# Patient Record
Sex: MALE
[De-identification: names, ages, dates, MRNs, and addresses within clinical notes are randomized per-mention and may not be internally consistent; named-entity substitution may affect disease eponyms.]

---

## 2020-12-15 ENCOUNTER — HOSPITAL ENCOUNTER (OUTPATIENT)
Dept: HOSPITAL 92 - LABBT | Age: 60
Discharge: HOME | End: 2020-12-15
Attending: ORTHOPAEDIC SURGERY
Payer: COMMERCIAL

## 2020-12-15 DIAGNOSIS — Z01.812: Primary | ICD-10-CM

## 2020-12-15 DIAGNOSIS — S82.201D: ICD-10-CM

## 2020-12-15 DIAGNOSIS — Z20.828: ICD-10-CM

## 2020-12-15 DIAGNOSIS — S72.92XD: ICD-10-CM

## 2020-12-15 PROCEDURE — 87635 SARS-COV-2 COVID-19 AMP PRB: CPT

## 2020-12-15 PROCEDURE — U0003 INFECTIOUS AGENT DETECTION BY NUCLEIC ACID (DNA OR RNA); SEVERE ACUTE RESPIRATORY SYNDROME CORONAVIRUS 2 (SARS-COV-2) (CORONAVIRUS DISEASE [COVID-19]), AMPLIFIED PROBE TECHNIQUE, MAKING USE OF HIGH THROUGHPUT TECHNOLOGIES AS DESCRIBED BY CMS-2020-01-R: HCPCS

## 2020-12-18 ENCOUNTER — HOSPITAL ENCOUNTER (INPATIENT)
Dept: HOSPITAL 92 - SDC | Age: 60
LOS: 2 days | Discharge: HOME | DRG: 481 | End: 2020-12-20
Attending: ORTHOPAEDIC SURGERY | Admitting: ORTHOPAEDIC SURGERY
Payer: COMMERCIAL

## 2020-12-18 VITALS — BODY MASS INDEX: 29.9 KG/M2

## 2020-12-18 DIAGNOSIS — I10: ICD-10-CM

## 2020-12-18 DIAGNOSIS — Z79.84: ICD-10-CM

## 2020-12-18 DIAGNOSIS — S72.332K: ICD-10-CM

## 2020-12-18 DIAGNOSIS — S82.231K: Primary | ICD-10-CM
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
## 2020-12-18 DIAGNOSIS — Z90.49: ICD-10-CM

## 2020-12-18 DIAGNOSIS — Z79.899: ICD-10-CM

## 2020-12-18 DIAGNOSIS — E11.9: ICD-10-CM

## 2020-12-18 DIAGNOSIS — T84.115A: ICD-10-CM

## 2020-12-18 PROCEDURE — 36416 COLLJ CAPILLARY BLOOD SPEC: CPT

## 2020-12-18 PROCEDURE — 0QH906Z INSERTION OF INTRAMEDULLARY INTERNAL FIXATION DEVICE INTO LEFT FEMORAL SHAFT, OPEN APPROACH: ICD-10-PCS | Performed by: ORTHOPAEDIC SURGERY

## 2020-12-18 PROCEDURE — C1713 ANCHOR/SCREW BN/BN,TIS/BN: HCPCS

## 2020-12-18 PROCEDURE — 76000 FLUOROSCOPY <1 HR PHYS/QHP: CPT

## 2020-12-18 PROCEDURE — 93005 ELECTROCARDIOGRAM TRACING: CPT

## 2020-12-18 PROCEDURE — 0QSG04Z REPOSITION RIGHT TIBIA WITH INTERNAL FIXATION DEVICE, OPEN APPROACH: ICD-10-PCS | Performed by: ORTHOPAEDIC SURGERY

## 2020-12-18 PROCEDURE — 93010 ELECTROCARDIOGRAM REPORT: CPT

## 2020-12-18 PROCEDURE — 0QP904Z REMOVAL OF INTERNAL FIXATION DEVICE FROM LEFT FEMORAL SHAFT, OPEN APPROACH: ICD-10-PCS | Performed by: ORTHOPAEDIC SURGERY

## 2020-12-18 PROCEDURE — 0QUG07Z SUPPLEMENT RIGHT TIBIA WITH AUTOLOGOUS TISSUE SUBSTITUTE, OPEN APPROACH: ICD-10-PCS | Performed by: ORTHOPAEDIC SURGERY

## 2020-12-18 PROCEDURE — 36415 COLL VENOUS BLD VENIPUNCTURE: CPT

## 2020-12-18 PROCEDURE — 0QDB0ZZ EXTRACTION OF RIGHT LOWER FEMUR, OPEN APPROACH: ICD-10-PCS | Performed by: ORTHOPAEDIC SURGERY

## 2020-12-18 PROCEDURE — 0QPG04Z REMOVAL OF INTERNAL FIXATION DEVICE FROM RIGHT TIBIA, OPEN APPROACH: ICD-10-PCS | Performed by: ORTHOPAEDIC SURGERY

## 2020-12-18 PROCEDURE — 85025 COMPLETE CBC W/AUTO DIFF WBC: CPT

## 2020-12-18 RX ADMIN — ACETAMINOPHEN AND CODEINE PHOSPHATE PRN TAB: 300; 30 TABLET ORAL at 21:36

## 2020-12-18 RX ADMIN — ASPIRIN SCH MG: 81 TABLET ORAL at 20:13

## 2020-12-18 RX ADMIN — CEFAZOLIN SODIUM SCH: 2 SOLUTION INTRAVENOUS at 16:49

## 2020-12-18 RX ADMIN — ACETAMINOPHEN AND CODEINE PHOSPHATE PRN TAB: 300; 30 TABLET ORAL at 17:36

## 2020-12-18 RX ADMIN — CEFAZOLIN SODIUM SCH MLS: 2 SOLUTION INTRAVENOUS at 23:46

## 2020-12-18 NOTE — RAD
XR Tib Fib Rt Leg 2 View



History: ORIF right tibia



Comparison: None.



Findings: 2 images from the operating room were obtained. Lateral plate-screw fixation of proximal ti
shira. Old screw fragment within the possible tibial metaphysis.



Impression: Fluoroscopy for procedure purposes.



Reported By: Toño Sparks 

Electronically Signed:  12/18/2020 3:00 PM

## 2020-12-18 NOTE — RAD
XR Femur Lt 2 View STANDARD



History: Left femur trochanteric nail



Comparison: None.



Findings: 4 images from the operating room were obtained. Satisfactory postoperative appearance.



Impression: Fluoroscopy for procedure purposes.



Reported By: Toño Sparks 

Electronically Signed:  12/18/2020 2:59 PM

## 2020-12-19 LAB
HGB BLD-MCNC: 11.2 G/DL (ref 14–18)
MCH RBC QN AUTO: 33.3 PG (ref 27–31)
MCV RBC AUTO: 96.7 FL (ref 78–98)
MDIFF COMPLETE?: YES
PLATELET # BLD AUTO: 115 THOU/UL (ref 130–400)
RBC # BLD AUTO: 3.36 MILL/UL (ref 4.7–6.1)
WBC # BLD AUTO: 6.3 THOU/UL (ref 4.8–10.8)

## 2020-12-19 RX ADMIN — ASPIRIN SCH MG: 81 TABLET ORAL at 08:05

## 2020-12-19 RX ADMIN — ALOGLIPTIN SCH MG: 25 TABLET, FILM COATED ORAL at 08:05

## 2020-12-19 RX ADMIN — ASPIRIN SCH MG: 81 TABLET ORAL at 20:06

## 2020-12-19 RX ADMIN — ACETAMINOPHEN AND CODEINE PHOSPHATE PRN TAB: 300; 30 TABLET ORAL at 16:04

## 2020-12-19 RX ADMIN — ACETAMINOPHEN AND CODEINE PHOSPHATE PRN TAB: 300; 30 TABLET ORAL at 20:10

## 2020-12-19 RX ADMIN — CEFAZOLIN SODIUM SCH MLS: 2 SOLUTION INTRAVENOUS at 08:05

## 2020-12-19 RX ADMIN — ACETAMINOPHEN AND CODEINE PHOSPHATE PRN TAB: 300; 30 TABLET ORAL at 08:04

## 2020-12-19 RX ADMIN — CEFAZOLIN SODIUM SCH MLS: 2 SOLUTION INTRAVENOUS at 16:07

## 2020-12-19 RX ADMIN — CEFAZOLIN SODIUM SCH MLS: 2 SOLUTION INTRAVENOUS at 23:01

## 2020-12-19 RX ADMIN — ACETAMINOPHEN AND CODEINE PHOSPHATE PRN TAB: 300; 30 TABLET ORAL at 01:52

## 2020-12-20 VITALS — TEMPERATURE: 98.6 F | DIASTOLIC BLOOD PRESSURE: 70 MMHG | SYSTOLIC BLOOD PRESSURE: 136 MMHG

## 2020-12-20 RX ADMIN — ACETAMINOPHEN AND CODEINE PHOSPHATE PRN TAB: 300; 30 TABLET ORAL at 07:35

## 2020-12-20 RX ADMIN — ACETAMINOPHEN AND CODEINE PHOSPHATE PRN TAB: 300; 30 TABLET ORAL at 02:25

## 2020-12-20 RX ADMIN — CEFAZOLIN SODIUM SCH MLS: 2 SOLUTION INTRAVENOUS at 07:57

## 2020-12-20 RX ADMIN — ALOGLIPTIN SCH MG: 25 TABLET, FILM COATED ORAL at 07:57

## 2020-12-20 RX ADMIN — ASPIRIN SCH MG: 81 TABLET ORAL at 07:57

## 2020-12-21 NOTE — OP
DATE OF PROCEDURE:  12/18/2020



PREOPERATIVE DIAGNOSES:  

1. Nonunion, left distal femoral shaft.

2. Nonunion, right proximal tibia.



POSTOPERATIVE DIAGNOSES:  

1. Nonunion, left distal femoral shaft.

2. Nonunion, right proximal tibia.



PROCEDURES PERFORMED:  

1. Exchanged reamed nailing of left femur with removal of broken hardware.

2. Open reduction and internal fixation, right proximal tibia with lateral plate and

bone graft. 

3. Reamed, irrigated, aspirated bone graft harvest from right femur, retrograde.

4. Removal of right tibial intramedullary nail.



ANESTHESIA:  General.



ASSISTANT:  Jose Valdez PA-C



TOURNIQUET TIME:  Approximately 90 minutes at 300 mmHg on the right leg.



IMPLANT:  

1. Synthes femoral EX nail, 13 x 360 mm with a single distal Crosslock screw.

2. Synthes tibial 4.5 mm proximal lateral plate.



COMPLICATIONS:  None.



DRAINS:  None.



SPECIMENS:  Explanted femoral and tibial nails with screws discarded.



OUTCOME:  Satisfactory.



INDICATIONS:  Mr. Cloud is a 60-year-old gentleman, who sustained a work injury

many months ago with injuries including an oblique fracture of the right proximal

tibia as well as a left distal femoral shaft fracture.  These were treated with

intramedullary nail stabilization.  Unfortunately, he has gone on to nonunion of

both fractures.  At this time, we are planning to proceed with the reamed exchange

nailing for the left femoral fracture and removal of nail and conversion to a 4.5

proximal lateral tibial plate for the right side.  Informed consent has been

obtained.  I believe, all questions have been answered. 



DESCRIPTION OF PROCEDURE:  The patient was brought to the operating room, and a

time-out performed followed by induction of general anesthesia.  Next, a sterile

prep and drape were performed to the left lower extremity.  At this point, the

fracture and hardware were inspected under C-arm guidance.  He was found to have a

distal broken Crosslock screw with intact proximal Crosslock screws.  This distal

screw was then approached with a small incision laterally and medially, and the two

ends of the screws were removed with the broken segment removed with a trephine in

broken hardware removal set.  The remaining three Crosslock screws were removed

through small stab wounds without difficulty.  Next, a midline anterior knee

incision was made.  After skin was sharply incised down the midline, dissection was

carried down along the medial aspect of the patellar tendon.  A portion of the

prepatellar fat pad was then excised, and at this point, the divot from the nail

insertion could be identified in the articular surface of the distal femur at the

notch.  A curette was used to remove this tissue revealing the nail.  Next, the

hardware removal set was used to remove this femoral nail, which did come out rather

easily.  Next, a ball-tipped guidewire was passed up the shaft of the femur, and

then, reaming was started at size 11 and continued up to size 14.  Next, a 13 x 360

mm femoral nail was inserted over this ball-tipped guidewire under C-arm guidance.

This was then locked in place with a single lock distally.  At the completion of

this, the knee was thoroughly irrigated with normal saline and then closed in layers

with 0 Vicryl for the peritenon, followed by 2-0 Vicryl and staples for the skin.

The proximal thigh incision from Crosslock removal was closed with staples as were

the small medial and lateral skin incisions from the Crosslock screw removal at the

knee.  Following a dressing that consisted of Xeroform gauze and an Ace wrap

dressing as well as Xeroform gauze and tape for the proximal thigh, the prep was

broken, and then, attention was placed at the right leg.  A sterile prep and drape

were performed of the right lower extremity with a tourniquet placed on the thigh.

Next, the limb was exsanguinated with Esmarch bandage, tourniquet inflated to 300

mmHg.  Next, an incision was made following the lateral crest of the tibia and then

extending along the lateral border of the patellar tendon and patella.  This

incision chosen to allow for both hardware removal of the tibial nail as well as

eventual plate placement.  Next, the three Crosslock screws proximally were removed,

this with C-arm guidance with one of the screws being broken and having to be

removed from both ends.  Once the proximal screws were removed, the 2 distal screws

also removed.  The dissection proximally was then carried along the lateral border

of the patellar tendon, this was mobilized medially, and then, the nail insertion

device was inserted on the proximal end of the nail.  This nail was then removed.

Using the same midline anterior knee incision, a threaded guidewire was passed from

the starting point for the surgery such as the retrograde nail would be done, this

was passed up the canal of the femur, checked under AP lateral C-arm images.  An

opening reamer was then used just to penetrate the distal cortex of the femur and

then a FAY reamer measuring 15 mm was passed up the femoral canal in standard

fashion collecting bone graft for the proximal tibia.  Next, the fracture nonunion

was identified.  The fibrinous material and scar tissue at the fracture were removed

using a combination of rongeur and curette, and then basically, the curette could be

passed easily into the canal of the tibia at the nonunion site.  Exploiting a small

hole anteriorly, a combination of the FAY bone graft as well as some cancellous bone

chips was packed into this void and along the cortex of the tibia both medially and

laterally.  Once fully packed, a 4.5 mm proximal lateral tibial plateau plate was

applied.  This was held in place with cortical screws distally and locking screws

proximally.  This achieved a near-anatomic alignment of the proximal tibia with good

bone grafting of the nonunion site.  Next, this wound was thoroughly irrigated with

normal saline and then closed in layers with the fascia of the lateral compartment

closed with 0 Vicryl followed by 0 Vicryl for the peritenon of the patellar tendon,

2-0 Vicryl then subcutaneously followed by staples for the skin.  Staples also used

for the distal incisions.  With completion of this, Xeroform gauze and Ace wrap

dressing applied to the knee.  Tourniquet was let down, and then, the patient was

transferred to recovery room in stable condition. 







Job ID:  [id number]

## 2022-01-27 ENCOUNTER — HOSPITAL ENCOUNTER (OUTPATIENT)
Dept: HOSPITAL 18 - NAV RAD | Age: 62
Discharge: HOME | End: 2022-01-27
Payer: COMMERCIAL

## 2022-01-27 DIAGNOSIS — S82.101A: Primary | ICD-10-CM

## 2022-01-27 DIAGNOSIS — S82.831A: ICD-10-CM

## 2023-02-21 ENCOUNTER — HOSPITAL ENCOUNTER (OUTPATIENT)
Dept: HOSPITAL 92 - ERS | Age: 63
Setting detail: OBSERVATION
LOS: 1 days | Discharge: HOME | End: 2023-02-22
Attending: STUDENT IN AN ORGANIZED HEALTH CARE EDUCATION/TRAINING PROGRAM | Admitting: STUDENT IN AN ORGANIZED HEALTH CARE EDUCATION/TRAINING PROGRAM
Payer: MEDICARE

## 2023-02-21 VITALS — BODY MASS INDEX: 29.8 KG/M2

## 2023-02-21 DIAGNOSIS — E78.5: ICD-10-CM

## 2023-02-21 DIAGNOSIS — N40.0: ICD-10-CM

## 2023-02-21 DIAGNOSIS — G51.0: Primary | ICD-10-CM

## 2023-02-21 DIAGNOSIS — Z79.84: ICD-10-CM

## 2023-02-21 DIAGNOSIS — Z20.822: ICD-10-CM

## 2023-02-21 DIAGNOSIS — E11.9: ICD-10-CM

## 2023-02-21 DIAGNOSIS — I10: ICD-10-CM

## 2023-02-21 DIAGNOSIS — Z79.899: ICD-10-CM

## 2023-02-21 LAB
ALBUMIN SERPL BCG-MCNC: 4.3 G/DL (ref 3.4–4.8)
ALP SERPL-CCNC: 74 U/L (ref 40–110)
ALT SERPL W P-5'-P-CCNC: 33 U/L (ref 8–55)
ANION GAP SERPL CALC-SCNC: 11 MMOL/L (ref 10–20)
APTT PPP: 26 SEC (ref 22.9–36.1)
AST SERPL-CCNC: 20 U/L (ref 5–34)
BASOPHILS # BLD AUTO: 0 THOU/UL (ref 0–0.2)
BASOPHILS NFR BLD AUTO: 0.5 % (ref 0–1)
BILIRUB SERPL-MCNC: 0.7 MG/DL (ref 0.2–1.2)
BUN SERPL-MCNC: 12 MG/DL (ref 8.4–25.7)
CALCIUM SERPL-MCNC: 9.1 MG/DL (ref 7.8–10.44)
CHLORIDE SERPL-SCNC: 104 MMOL/L (ref 98–107)
CO2 SERPL-SCNC: 25 MMOL/L (ref 23–31)
CREAT CL PREDICTED SERPL C-G-VRATE: 0 ML/MIN (ref 70–130)
EOSINOPHIL # BLD AUTO: 0.1 THOU/UL (ref 0–0.7)
EOSINOPHIL NFR BLD AUTO: 1.7 % (ref 0–10)
GLOBULIN SER CALC-MCNC: 2.9 G/DL (ref 2.4–3.5)
GLUCOSE SERPL-MCNC: 223 MG/DL (ref 80–115)
HGB BLD-MCNC: 17.3 G/DL (ref 14–18)
INR PPP: 0.9
LYMPHOCYTES # BLD: 1.6 THOU/UL (ref 1.2–3.4)
LYMPHOCYTES NFR BLD AUTO: 24.8 % (ref 21–51)
MCH RBC QN AUTO: 33.9 PG (ref 27–31)
MCV RBC AUTO: 96.5 FL (ref 78–98)
MONOCYTES # BLD AUTO: 0.6 THOU/UL (ref 0.11–0.59)
MONOCYTES NFR BLD AUTO: 9.4 % (ref 0–10)
NEUTROPHILS # BLD AUTO: 4 THOU/UL (ref 1.4–6.5)
NEUTROPHILS NFR BLD AUTO: 63.6 % (ref 42–75)
PLATELET # BLD AUTO: 143 10X3/UL (ref 130–400)
POTASSIUM SERPL-SCNC: 4.1 MMOL/L (ref 3.5–5.1)
PROTHROMBIN TIME: 12.4 SEC (ref 12–14.7)
RBC # BLD AUTO: 5.11 MILL/UL (ref 4.7–6.1)
SODIUM SERPL-SCNC: 136 MMOL/L (ref 136–145)
WBC # BLD AUTO: 6.2 10X3/UL (ref 4.8–10.8)

## 2023-02-21 PROCEDURE — 80061 LIPID PANEL: CPT

## 2023-02-21 PROCEDURE — 96365 THER/PROPH/DIAG IV INF INIT: CPT

## 2023-02-21 PROCEDURE — 85730 THROMBOPLASTIN TIME PARTIAL: CPT

## 2023-02-21 PROCEDURE — 80048 BASIC METABOLIC PNL TOTAL CA: CPT

## 2023-02-21 PROCEDURE — 70498 CT ANGIOGRAPHY NECK: CPT

## 2023-02-21 PROCEDURE — U0003 INFECTIOUS AGENT DETECTION BY NUCLEIC ACID (DNA OR RNA); SEVERE ACUTE RESPIRATORY SYNDROME CORONAVIRUS 2 (SARS-COV-2) (CORONAVIRUS DISEASE [COVID-19]), AMPLIFIED PROBE TECHNIQUE, MAKING USE OF HIGH THROUGHPUT TECHNOLOGIES AS DESCRIBED BY CMS-2020-01-R: HCPCS

## 2023-02-21 PROCEDURE — 96375 TX/PRO/DX INJ NEW DRUG ADDON: CPT

## 2023-02-21 PROCEDURE — 85025 COMPLETE CBC W/AUTO DIFF WBC: CPT

## 2023-02-21 PROCEDURE — 85610 PROTHROMBIN TIME: CPT

## 2023-02-21 PROCEDURE — 71045 X-RAY EXAM CHEST 1 VIEW: CPT

## 2023-02-21 PROCEDURE — U0005 INFEC AGEN DETEC AMPLI PROBE: HCPCS

## 2023-02-21 PROCEDURE — 83036 HEMOGLOBIN GLYCOSYLATED A1C: CPT

## 2023-02-21 PROCEDURE — 93005 ELECTROCARDIOGRAM TRACING: CPT

## 2023-02-21 PROCEDURE — 80053 COMPREHEN METABOLIC PANEL: CPT

## 2023-02-21 PROCEDURE — 70496 CT ANGIOGRAPHY HEAD: CPT

## 2023-02-21 PROCEDURE — 82962 GLUCOSE BLOOD TEST: CPT

## 2023-02-21 PROCEDURE — 84484 ASSAY OF TROPONIN QUANT: CPT

## 2023-02-21 PROCEDURE — 70551 MRI BRAIN STEM W/O DYE: CPT

## 2023-02-21 PROCEDURE — 70450 CT HEAD/BRAIN W/O DYE: CPT

## 2023-02-21 PROCEDURE — G0378 HOSPITAL OBSERVATION PER HR: HCPCS

## 2023-02-21 PROCEDURE — 99285 EMERGENCY DEPT VISIT HI MDM: CPT

## 2023-02-21 PROCEDURE — 36415 COLL VENOUS BLD VENIPUNCTURE: CPT

## 2023-02-21 PROCEDURE — 36416 COLLJ CAPILLARY BLOOD SPEC: CPT

## 2023-02-22 VITALS — SYSTOLIC BLOOD PRESSURE: 164 MMHG | TEMPERATURE: 98.2 F | DIASTOLIC BLOOD PRESSURE: 85 MMHG

## 2023-02-22 LAB
ANION GAP SERPL CALC-SCNC: 13 MMOL/L (ref 10–20)
BASOPHILS # BLD AUTO: 0 THOU/UL (ref 0–0.2)
BASOPHILS NFR BLD AUTO: 0.4 % (ref 0–1)
BUN SERPL-MCNC: 12 MG/DL (ref 8.4–25.7)
CALCIUM SERPL-MCNC: 9.3 MG/DL (ref 7.8–10.44)
CHD RISK SERPL-RTO: 3.5 (ref ?–4.5)
CHLORIDE SERPL-SCNC: 105 MMOL/L (ref 98–107)
CHOLEST SERPL-MCNC: 143 MG/DL
CO2 SERPL-SCNC: 20 MMOL/L (ref 23–31)
CREAT CL PREDICTED SERPL C-G-VRATE: 104 ML/MIN (ref 70–130)
EOSINOPHIL # BLD AUTO: 0 THOU/UL (ref 0–0.7)
EOSINOPHIL NFR BLD AUTO: 0.1 % (ref 0–10)
GLUCOSE SERPL-MCNC: 278 MG/DL (ref 80–115)
HDLC SERPL-MCNC: 41 MG/DL
HGB BLD-MCNC: 16.9 G/DL (ref 14–18)
LDLC SERPL CALC-MCNC: 87 MG/DL
LYMPHOCYTES # BLD: 0.5 THOU/UL (ref 1.2–3.4)
LYMPHOCYTES NFR BLD AUTO: 7.8 % (ref 21–51)
MCH RBC QN AUTO: 33.5 PG (ref 27–31)
MCV RBC AUTO: 97.1 FL (ref 78–98)
MONOCYTES # BLD AUTO: 0.1 THOU/UL (ref 0.11–0.59)
MONOCYTES NFR BLD AUTO: 2.1 % (ref 0–10)
NEUTROPHILS # BLD AUTO: 5.8 THOU/UL (ref 1.4–6.5)
NEUTROPHILS NFR BLD AUTO: 89.7 % (ref 42–75)
PLATELET # BLD AUTO: 141 10X3/UL (ref 130–400)
POTASSIUM SERPL-SCNC: 4.2 MMOL/L (ref 3.5–5.1)
RBC # BLD AUTO: 5.04 MILL/UL (ref 4.7–6.1)
SODIUM SERPL-SCNC: 134 MMOL/L (ref 136–145)
TRIGL SERPL-MCNC: 77 MG/DL (ref ?–150)
WBC # BLD AUTO: 6.5 10X3/UL (ref 4.8–10.8)